# Patient Record
Sex: FEMALE | Race: WHITE
[De-identification: names, ages, dates, MRNs, and addresses within clinical notes are randomized per-mention and may not be internally consistent; named-entity substitution may affect disease eponyms.]

---

## 2018-09-07 NOTE — XRAY REPORT
FINAL REPORT



EXAM:  XR CHEST ROUTINE 2V



HISTORY:  pre-surgical, chest cold,congestion 



TECHNIQUE:  PA and lateral views of the chest



Comparison: None 



FINDINGS:  

There prominence of the interstitial markings in both lungs,

acute versus chronic.



There is no evidence of focal infiltrate, pneumothorax or pleural

fluid collection.



The cardiac silhouette is normal size.



The thoracic aorta and bony structures are unremarkable.



IMPRESSION:  

1. Prominence of the interstitial markings in both lungs, acute

versus chronic. No evidence of focal infiltrate.

## 2018-09-11 ENCOUNTER — HOSPITAL ENCOUNTER (OUTPATIENT)
Dept: HOSPITAL 5 - OR | Age: 58
Discharge: HOME | End: 2018-09-11
Attending: SURGERY
Payer: COMMERCIAL

## 2018-09-11 VITALS — SYSTOLIC BLOOD PRESSURE: 145 MMHG | DIASTOLIC BLOOD PRESSURE: 76 MMHG

## 2018-09-11 DIAGNOSIS — Z90.11: ICD-10-CM

## 2018-09-11 DIAGNOSIS — R92.1: ICD-10-CM

## 2018-09-11 DIAGNOSIS — J00: ICD-10-CM

## 2018-09-11 DIAGNOSIS — R09.89: ICD-10-CM

## 2018-09-11 DIAGNOSIS — Z98.51: ICD-10-CM

## 2018-09-11 DIAGNOSIS — N60.11: ICD-10-CM

## 2018-09-11 DIAGNOSIS — N60.91: Primary | ICD-10-CM

## 2018-09-11 DIAGNOSIS — Z98.890: ICD-10-CM

## 2018-09-11 DIAGNOSIS — N60.81: ICD-10-CM

## 2018-09-11 PROCEDURE — 88342 IMHCHEM/IMCYTCHM 1ST ANTB: CPT

## 2018-09-11 PROCEDURE — 19125 EXCISION BREAST LESION: CPT

## 2018-09-11 PROCEDURE — 71046 X-RAY EXAM CHEST 2 VIEWS: CPT

## 2018-09-11 PROCEDURE — 88361 TUMOR IMMUNOHISTOCHEM/COMPUT: CPT

## 2018-09-11 PROCEDURE — 88307 TISSUE EXAM BY PATHOLOGIST: CPT

## 2018-09-11 PROCEDURE — 19281 PERQ DEVICE BREAST 1ST IMAG: CPT

## 2018-09-11 PROCEDURE — 76098 X-RAY EXAM SURGICAL SPECIMEN: CPT

## 2018-09-11 PROCEDURE — 19282 PERQ DEVICE BREAST EA IMAG: CPT

## 2018-09-11 NOTE — OPERATIVE REPORT
Operative Report


Operative Report: 





September 11, 2018





Preoperative diagnosis: Right breast atypia of the upper outer quadrant





Postoperative diagnosis: Same





Procedure: Right needle localization excisional biopsy of the upper outer 

quadrant





Surgeon: Shalonda Rodríguez MD





Anesthesia: General





Findings: Right wires and clip present within radiograph specimen





Complications: None





EBL: Minimal





Disposition: PACU in good condition





Indications for operative procedure: This is a 58 year old lady with newly 

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 Recommendations are to 

proceed with an excisional biopsy to rule out malignancy. She wished to proceed 

with the above procedure.





Procedure in detail: The patient was taken to radiology for wire placement for 

localization known area of ADH to also additional area of calcifications that 

were not biopsied. Patient was then taken to the operating room. Gen. 

anesthesia was administered. The right breast and axilla were prepped and 

draped in the normal sterile operative fashion. The wire was identified. 

Timeout was performed.  Lateral breast incision was made with a 15 blade knife 

and dissection taken down to subcutaneous tissues. First began raising of the 

lateral flap with removal of the wire from the skin with dissection take down 

to the pectoralis muscle, followed by raising of the medial flap, superior flap 

and inferior flap with all flaps taken down to the pectoralis muscle. The 

breast area of concern was appropriately removed posteriorly from the 

pectoralis muscle with the aid of the Bovie cautery. The wire was not 

encountered. Specimen was marked and then sent to pathology and radiology; 

radiograph specimen with wires and clip present. Breast cavity was irrigated 

and hemostasis was obtained. The breast cavity was anesthetized with 1% 

lidocaine mixed with quarter percent Marcaine. The posterior deep breast 

tissues were approximated and closed using interrupted 3-0 Vicryl. The 

subcutaneous tissues were approximated and closed using interrupted 3-0 Vicryl 

followed by closing of the skin with a running 4-0 Monocryl and skin affix. The 

patient tolerated surgery very well and she was awaken from anesthesia without 

any complication and transported to PACU in good condition.

## 2018-09-11 NOTE — SHORT STAY SUMMARY
Short Stay Documentation


Date of service: 09/11/18





- History


H&P: obtained from office





- Allergies and Medications


Current Medications: 


 Allergies





No Known Allergies Allergy (Verified 09/05/18 17:27)


 





 Home Medications











 Medication  Instructions  Recorded  Confirmed  Last Taken  Type


 


HYDROcodone/APAP 5-325 [Boss 1 each PO Q6HR PRN #30 tablet 09/11/18  Unknown Rx





5/325]     








Active Medications





Cefazolin Sodium (Ancef/Sterile Water 2 Gm/20 Ml)  2 gm IV PREOP NR


   Stop: 09/11/18 23:59


Famotidine (Pepcid)  20 mg PO PREOP NR


   Stop: 09/11/18 20:00


   Last Admin: 09/11/18 09:18 Dose:  20 mg


Lactated Ringer's (Lactated Ringers)  1,000 mls @ 100 mls/hr IV AS DIRECT AJLIL


   Last Admin: 09/11/18 09:10 Dose:  100 mls/hr


Midazolam HCl (Versed)  2 mg IV PREOP NR


   Stop: 09/11/18 23:59


   Last Admin: 09/11/18 09:31 Dose:  2 mg











- Brief post op/procedure progress note


Date of procedure: 09/11/18


Pre-op diagnosis: Right breast ADH of the upper outer quadrant


Post-op diagnosis: same


Procedure: 





Right needle localization excisional biopsy


Anesthesia: GETA


Findings: 





Wires and clip present within radiograph specimen


Surgeon: YAW REBOLLAR


Estimated blood loss: minimal


Pathology: list (right needle excisional biopsy)


Specimen disposition: to lab


Condition: stable





- Disposition


Condition at discharge: Good


Disposition: [id number] TO HOME OR SELFCARE





Short Stay Discharge Plan


Activity: other (no heavy lifting)


Diet: regular


Wound: other (keep incision clean and dry; may shower in 24 hours; no baths, 

pools or lakes; do not rub or scrub incision; wear breast binder)


Follow up with: 


YAW REBOLLAR MD [Staff Physician] - 7 Days


Prescriptions: 


HYDROcodone/APAP 5-325 [Boss 5/325] 1 each PO Q6HR PRN #30 tablet


 PRN Reason: Pain

## 2018-09-11 NOTE — ANESTHESIA CONSULTATION
Anesthesia Consult and Med Hx


Date of service: 09/11/18





- Airway


Anesthetic Teeth Evaluation: Good


ROM Head & Neck: Adequate


Mental/Hyoid Distance: Adequate


Mallampati Class: Class II


Intubation Access Assessment: Good





- Pulmonary Exam


CTA: Yes





- Cardiac Exam


Cardiac Exam: No Murmur





- Pre-Operative Health Status


ASA Pre-Surgery Classification: ASA1


Proposed Anesthetic Plan: General





- Central Nervous System


Hx Psychiatric Problems: No





- Other Systems


Hx Cancer: No

## 2018-09-11 NOTE — MAMMOGRAPHY REPORT
NEEDLE LOCALIZATION AND HOOKWIRE PLACEMENT 2 SITES RIGHT 

BREAST:09/11/18 



CLINICAL: Right breast calcifications.



COMPARISON: [facility] 06/25/18



FINDINGS: Using mammographic guidance, 6 cc of 1% lidocaine local 

anesthesia and sterile technique, two 5-cm Herndon needles with 

hookwires were placed from a CC from above approach to localize a group 

of calcifications and a biopsy clip which is approximately 2 cm lateral 

to the calcifications.  Needles were removed and the hookwires were 

deployed. Satisfactory placement was confirmed on orthogonal views. The 

patient tolerated the procedure well and there were no apparent 

complications.



IMPRESSION: Uncomplicated placement of 2 hookwires right breast.

## 2018-09-11 NOTE — MAMMOGRAPHY REPORT
SPECIMEN RADIOGRAPH RIGHT BREAST: 09/11/18 07:14:00





CLINICAL: Surgical excision of calcifications and a localizer clip.



FINDINGS: The targeted calcifications, a localizer clip and 2 hookwires 

are are identified within the specimen.



IMPRESSION: Excision of the targeted lesion.

## 2018-09-11 NOTE — MAMMOGRAPHY REPORT
NEEDLE LOCALIZATION AND HOOKWIRE PLACEMENT 2 SITES RIGHT 

BREAST:09/11/18 



CLINICAL: Right breast calcifications.



COMPARISON: [facility] 06/25/18



FINDINGS: Using mammographic guidance, 6 cc of 1% lidocaine local 

anesthesia and sterile technique, two 5-cm Herndon needles with 

hookwires were placed from a CC from above approach to localize a group 

of calcifications and a biopsy clip which is approximately 2 cm lateral 

to the calcifications.  Needles were removed and the hookwires were 

deployed. Satisfactory placement was confirmed on orthogonal views. The 

patient tolerated the procedure well and there were no apparent 

complications.



IMPRESSION: Uncomplicated placement of 2 hookwires right breast.